# Patient Record
Sex: FEMALE | Race: WHITE | URBAN - METROPOLITAN AREA
[De-identification: names, ages, dates, MRNs, and addresses within clinical notes are randomized per-mention and may not be internally consistent; named-entity substitution may affect disease eponyms.]

---

## 2020-09-08 DIAGNOSIS — Z11.59 ENCOUNTER FOR SCREENING FOR OTHER VIRAL DISEASES: Primary | ICD-10-CM

## 2020-09-22 RX ORDER — BUPROPION HYDROCHLORIDE 300 MG/1
300 TABLET ORAL EVERY MORNING
COMMUNITY

## 2020-09-22 RX ORDER — BUPROPION HYDROCHLORIDE 150 MG/1
150 TABLET ORAL EVERY MORNING
COMMUNITY

## 2020-09-22 RX ORDER — LORATADINE 10 MG/1
10 TABLET ORAL DAILY
COMMUNITY

## 2020-09-22 RX ORDER — CHOLECALCIFEROL (VITAMIN D3) 50 MCG
1 TABLET ORAL DAILY
COMMUNITY

## 2020-09-22 ASSESSMENT — MIFFLIN-ST. JEOR: SCORE: 1325.39

## 2020-09-26 DIAGNOSIS — Z11.59 ENCOUNTER FOR SCREENING FOR OTHER VIRAL DISEASES: ICD-10-CM

## 2020-09-26 PROCEDURE — U0003 INFECTIOUS AGENT DETECTION BY NUCLEIC ACID (DNA OR RNA); SEVERE ACUTE RESPIRATORY SYNDROME CORONAVIRUS 2 (SARS-COV-2) (CORONAVIRUS DISEASE [COVID-19]), AMPLIFIED PROBE TECHNIQUE, MAKING USE OF HIGH THROUGHPUT TECHNOLOGIES AS DESCRIBED BY CMS-2020-01-R: HCPCS | Performed by: PLASTIC SURGERY

## 2020-09-27 LAB
SARS-COV-2 RNA SPEC QL NAA+PROBE: NOT DETECTED
SPECIMEN SOURCE: NORMAL

## 2020-09-28 ENCOUNTER — ANESTHESIA EVENT (OUTPATIENT)
Dept: SURGERY | Facility: AMBULATORY SURGERY CENTER | Age: 45
End: 2020-09-28

## 2020-09-28 NOTE — ANESTHESIA PREPROCEDURE EVALUATION
"Anesthesia Pre-Procedure Evaluation    Patient: Paulina Sue   MRN:     [id number] Gender:   female   Age:    44 year old :      1975        Preoperative Diagnosis: Encounter for cosmetic surgery [Z41.1]   Procedure(s):  Breast Augmentation     LABS:  CBC: No results found for: WBC, HGB, HCT, PLT  BMP: No results found for: NA, POTASSIUM, CHLORIDE, CO2, BUN, CR, GLC  COAGS: No results found for: PTT, INR, FIBR  POC: No results found for: BGM, HCG, HCGS  OTHER: No results found for: PH, LACT, A1C, EDVIN, PHOS, MAG, ALBUMIN, PROTTOTAL, ALT, AST, GGT, ALKPHOS, BILITOTAL, BILIDIRECT, LIPASE, AMYLASE, KENNETH, TSH, T4, T3, CRP, SED     Preop Vitals    BP Readings from Last 3 Encounters:   No data found for BP    Pulse Readings from Last 3 Encounters:   No data found for Pulse      Resp Readings from Last 3 Encounters:   No data found for Resp    SpO2 Readings from Last 3 Encounters:   No data found for SpO2      Temp Readings from Last 1 Encounters:   No data found for Temp    Ht Readings from Last 1 Encounters:   20 1.676 m (5' 6\")      Wt Readings from Last 1 Encounters:   20 64.9 kg (143 lb)    Estimated body mass index is 23.08 kg/m  as calculated from the following:    Height as of this encounter: 1.676 m (5' 6\").    Weight as of this encounter: 64.9 kg (143 lb).     LDA:        Past Medical History:   Diagnosis Date     Motion sickness      Thyroid disease       Past Surgical History:   Procedure Laterality Date     GYN SURGERY        Allergies   Allergen Reactions     Erythromycin Rash     Penicillins Rash                 PHYSICAL EXAM:   Mental Status/Neuro: A/A/O   Airway: Facies: Feasible  Mallampati: I  Mouth/Opening: Full  TM distance: > 6 cm  Neck ROM: Full   Respiratory:   Resp. Rate: Normal     Resp. Effort: Normal      CV:   Rate: Age appropriate  Edema: None   Comments:      Dental: Normal Dentition                Assessment:   ASA SCORE: 2    H&P: History and physical reviewed and " following examination; no interval change.    NPO Status: NPO Appropriate     Plan:   Anes. Type:  General   Pre-Medication: None   Induction:  IV (Standard)   Airway: ETT; Oral   Access/Monitoring: PIV   Maintenance: Balanced     Postop Plan:   Postop Pain: Opioids  Postop Sedation/Airway: Not planned  Disposition: Outpatient     PONV Management:   Adult Risk Factors: Female, Postop Opioids   Prevention: Ondansetron, Dexamethasone     CONSENT: Direct conversation   Plan and risks discussed with: Patient          Comments for Plan/Consent:  ETT if desires muscle relaxant otherwise LMA.                 Ramón Guzman MD       ANESTHESIA PREOP EVALUATION    PROCEDURE: Procedure(s):  Breast Augmentation    HPI: Paulina Sue is a 44 year old female who presents for above procedure.    PAST MEDICAL HISTORY:    Past Medical History:   Diagnosis Date     Motion sickness      Thyroid disease        PAST SURGICAL HISTORY:    Past Surgical History:   Procedure Laterality Date     GYN SURGERY         SOCIAL HISTORY:       Social History     Tobacco Use     Smoking status: Never Smoker     Smokeless tobacco: Never Used   Substance Use Topics     Alcohol use: Yes     Comment: rare       ALLERGIES:     Allergies   Allergen Reactions     Erythromycin Rash     Penicillins Rash       MEDICATIONS:     (Not in a hospital admission)      Current Outpatient Medications   Medication Sig Dispense Refill     buPROPion (WELLBUTRIN XL) 150 MG 24 hr tablet Take 150 mg by mouth every morning       buPROPion (WELLBUTRIN XL) 300 MG 24 hr tablet Take 300 mg by mouth every morning       LEVOTHYROXINE SODIUM PO        loratadine (CLARITIN) 10 MG tablet Take 10 mg by mouth daily       vitamin D3 (CHOLECALCIFEROL) 50 mcg (2000 units) tablet Take 1 tablet by mouth daily         Current Outpatient Medications Ordered in Epic   Medication Sig Dispense Refill     buPROPion (WELLBUTRIN XL) 150 MG 24 hr tablet Take 150 mg by mouth every morning        buPROPion (WELLBUTRIN XL) 300 MG 24 hr tablet Take 300 mg by mouth every morning       LEVOTHYROXINE SODIUM PO        loratadine (CLARITIN) 10 MG tablet Take 10 mg by mouth daily       vitamin D3 (CHOLECALCIFEROL) 50 mcg (2000 units) tablet Take 1 tablet by mouth daily       No current Epic-ordered facility-administered medications on file.        PHYSICAL EXAM:    Vitals: T Data Unavailable, P Data Unavailable, BP Data Unavailable, R Data Unavailable, SpO2  , Weight   Wt Readings from Last 2 Encounters:   09/22/20 64.9 kg (143 lb)       See doc flowsheet    NPO STATUS: see doc flowsheet    LABS:    BMP:  No results for input(s): NA, POTASSIUM, CHLORIDE, CO2, BUN, CR, GLC, EDVIN in the last 69665 hours.    LFTs:   No results for input(s): PROTTOTAL, ALBUMIN, BILITOTAL, ALKPHOS, AST, ALT, BILIDIRECT in the last 21697 hours.    CBC:   No results for input(s): WBC, RBC, HGB, HCT, MCV, MCH, MCHC, RDW, PLT in the last 53709 hours.    Coags:  No results for input(s): INR, PTT, FIBR in the last 27391 hours.    Imaging:  No orders to display       Ramón Guzman MD  Anesthesiology Staff  Pager [phone]    9/28/2020  12:52 PM

## 2020-09-29 ENCOUNTER — ANESTHESIA (OUTPATIENT)
Dept: SURGERY | Facility: AMBULATORY SURGERY CENTER | Age: 45
End: 2020-09-29
Payer: COMMERCIAL

## 2020-09-29 ENCOUNTER — HOSPITAL ENCOUNTER (OUTPATIENT)
Facility: AMBULATORY SURGERY CENTER | Age: 45
Discharge: HOME | End: 2020-09-29
Attending: PLASTIC SURGERY | Admitting: PLASTIC SURGERY
Payer: COMMERCIAL

## 2020-09-29 VITALS
SYSTOLIC BLOOD PRESSURE: 124 MMHG | BODY MASS INDEX: 22.98 KG/M2 | DIASTOLIC BLOOD PRESSURE: 80 MMHG | RESPIRATION RATE: 16 BRPM | HEART RATE: 67 BPM | HEIGHT: 66 IN | TEMPERATURE: 97.8 F | WEIGHT: 143 LBS | OXYGEN SATURATION: 100 %

## 2020-09-29 DIAGNOSIS — R52 PAIN: Primary | ICD-10-CM

## 2020-09-29 DIAGNOSIS — T88.59XA NAUSEA AFTER ANESTHESIA, INITIAL ENCOUNTER: ICD-10-CM

## 2020-09-29 DIAGNOSIS — R11.0 NAUSEA AFTER ANESTHESIA, INITIAL ENCOUNTER: ICD-10-CM

## 2020-09-29 LAB — HCG UR QL: NEGATIVE

## 2020-09-29 PROCEDURE — L8600 IMPLANT BREAST SILICONE/EQ: HCPCS | Mod: 50

## 2020-09-29 PROCEDURE — G8907 PT DOC NO EVENTS ON DISCHARG: HCPCS

## 2020-09-29 PROCEDURE — 36000140 ZZH SURGERY LEVEL COSMETIC 120 MIN

## 2020-09-29 PROCEDURE — 81025 URINE PREGNANCY TEST: CPT | Performed by: STUDENT IN AN ORGANIZED HEALTH CARE EDUCATION/TRAINING PROGRAM

## 2020-09-29 PROCEDURE — G8916 PT W IV AB GIVEN ON TIME: HCPCS

## 2020-09-29 DEVICE — IMPLANTABLE DEVICE: Type: IMPLANTABLE DEVICE | Site: BREAST | Status: FUNCTIONAL

## 2020-09-29 RX ORDER — SODIUM CHLORIDE, SODIUM LACTATE, POTASSIUM CHLORIDE, CALCIUM CHLORIDE 600; 310; 30; 20 MG/100ML; MG/100ML; MG/100ML; MG/100ML
INJECTION, SOLUTION INTRAVENOUS CONTINUOUS
Status: DISCONTINUED | OUTPATIENT
Start: 2020-09-29 | End: 2020-09-30 | Stop reason: HOSPADM

## 2020-09-29 RX ORDER — CLINDAMYCIN PHOSPHATE 900 MG/50ML
900 INJECTION, SOLUTION INTRAVENOUS
Status: DISCONTINUED | OUTPATIENT
Start: 2020-09-29 | End: 2020-09-30 | Stop reason: HOSPADM

## 2020-09-29 RX ORDER — ONDANSETRON 2 MG/ML
4 INJECTION INTRAMUSCULAR; INTRAVENOUS EVERY 30 MIN PRN
Status: DISCONTINUED | OUTPATIENT
Start: 2020-09-29 | End: 2020-09-30 | Stop reason: HOSPADM

## 2020-09-29 RX ORDER — ACETAMINOPHEN 325 MG/1
975 TABLET ORAL ONCE
Status: COMPLETED | OUTPATIENT
Start: 2020-09-29 | End: 2020-09-29

## 2020-09-29 RX ORDER — ONDANSETRON 4 MG/1
4-8 TABLET, ORALLY DISINTEGRATING ORAL EVERY 8 HOURS PRN
Qty: 4 TABLET | Refills: 0
Start: 2020-09-29

## 2020-09-29 RX ORDER — HYDROXYZINE HYDROCHLORIDE 25 MG/1
25 TABLET, FILM COATED ORAL
Status: DISCONTINUED | OUTPATIENT
Start: 2020-09-29 | End: 2020-09-30 | Stop reason: HOSPADM

## 2020-09-29 RX ORDER — CEFAZOLIN SODIUM 2 G/100ML
INJECTION, SOLUTION INTRAVENOUS PRN
Status: DISCONTINUED | OUTPATIENT
Start: 2020-09-29 | End: 2020-09-29

## 2020-09-29 RX ORDER — OXYCODONE AND ACETAMINOPHEN 5; 325 MG/1; MG/1
2 TABLET ORAL
Status: DISCONTINUED | OUTPATIENT
Start: 2020-09-29 | End: 2020-09-30 | Stop reason: HOSPADM

## 2020-09-29 RX ORDER — MEPERIDINE HYDROCHLORIDE 25 MG/ML
12.5 INJECTION INTRAMUSCULAR; INTRAVENOUS; SUBCUTANEOUS
Status: DISCONTINUED | OUTPATIENT
Start: 2020-09-29 | End: 2020-09-30 | Stop reason: HOSPADM

## 2020-09-29 RX ORDER — ONDANSETRON 2 MG/ML
INJECTION INTRAMUSCULAR; INTRAVENOUS PRN
Status: DISCONTINUED | OUTPATIENT
Start: 2020-09-29 | End: 2020-09-29

## 2020-09-29 RX ORDER — LIDOCAINE HYDROCHLORIDE 20 MG/ML
INJECTION, SOLUTION INFILTRATION; PERINEURAL PRN
Status: DISCONTINUED | OUTPATIENT
Start: 2020-09-29 | End: 2020-09-29

## 2020-09-29 RX ORDER — OXYCODONE HYDROCHLORIDE 5 MG/1
5 TABLET ORAL EVERY 4 HOURS PRN
Status: DISCONTINUED | OUTPATIENT
Start: 2020-09-29 | End: 2020-09-30 | Stop reason: HOSPADM

## 2020-09-29 RX ORDER — PROPOFOL 10 MG/ML
INJECTION, EMULSION INTRAVENOUS PRN
Status: DISCONTINUED | OUTPATIENT
Start: 2020-09-29 | End: 2020-09-29

## 2020-09-29 RX ORDER — HYDROXYZINE PAMOATE 25 MG/1
25 CAPSULE ORAL EVERY 6 HOURS PRN
Qty: 30 CAPSULE | Refills: 0
Start: 2020-09-29

## 2020-09-29 RX ORDER — OXYCODONE AND ACETAMINOPHEN 5; 325 MG/1; MG/1
1-2 TABLET ORAL EVERY 4 HOURS PRN
Qty: 16 TABLET | Refills: 0
Start: 2020-09-29

## 2020-09-29 RX ORDER — NALOXONE HYDROCHLORIDE 0.4 MG/ML
.1-.4 INJECTION, SOLUTION INTRAMUSCULAR; INTRAVENOUS; SUBCUTANEOUS
Status: DISCONTINUED | OUTPATIENT
Start: 2020-09-29 | End: 2020-09-30 | Stop reason: HOSPADM

## 2020-09-29 RX ORDER — DEXAMETHASONE SODIUM PHOSPHATE 4 MG/ML
10 INJECTION, SOLUTION INTRA-ARTICULAR; INTRALESIONAL; INTRAMUSCULAR; INTRAVENOUS; SOFT TISSUE
Status: COMPLETED | OUTPATIENT
Start: 2020-09-29 | End: 2020-09-29

## 2020-09-29 RX ORDER — ONDANSETRON 4 MG/1
4 TABLET, ORALLY DISINTEGRATING ORAL
Status: DISCONTINUED | OUTPATIENT
Start: 2020-09-29 | End: 2020-09-30 | Stop reason: HOSPADM

## 2020-09-29 RX ORDER — PROPOFOL 10 MG/ML
INJECTION, EMULSION INTRAVENOUS CONTINUOUS PRN
Status: DISCONTINUED | OUTPATIENT
Start: 2020-09-29 | End: 2020-09-29

## 2020-09-29 RX ORDER — AZITHROMYCIN 500 MG/1
500 TABLET, FILM COATED ORAL DAILY
Status: DISCONTINUED | OUTPATIENT
Start: 2020-09-29 | End: 2020-09-30 | Stop reason: HOSPADM

## 2020-09-29 RX ORDER — LIDOCAINE 40 MG/G
CREAM TOPICAL
Status: DISCONTINUED | OUTPATIENT
Start: 2020-09-29 | End: 2020-09-30 | Stop reason: HOSPADM

## 2020-09-29 RX ORDER — BUPIVACAINE HYDROCHLORIDE AND EPINEPHRINE 2.5; 5 MG/ML; UG/ML
INJECTION, SOLUTION INFILTRATION; PERINEURAL PRN
Status: DISCONTINUED | OUTPATIENT
Start: 2020-09-29 | End: 2020-09-29 | Stop reason: HOSPADM

## 2020-09-29 RX ORDER — FENTANYL CITRATE 50 UG/ML
25-50 INJECTION, SOLUTION INTRAMUSCULAR; INTRAVENOUS EVERY 5 MIN PRN
Status: DISCONTINUED | OUTPATIENT
Start: 2020-09-29 | End: 2020-09-30 | Stop reason: HOSPADM

## 2020-09-29 RX ORDER — ONDANSETRON 4 MG/1
4 TABLET, ORALLY DISINTEGRATING ORAL EVERY 30 MIN PRN
Status: DISCONTINUED | OUTPATIENT
Start: 2020-09-29 | End: 2020-09-30 | Stop reason: HOSPADM

## 2020-09-29 RX ORDER — FENTANYL CITRATE 50 UG/ML
INJECTION, SOLUTION INTRAMUSCULAR; INTRAVENOUS PRN
Status: DISCONTINUED | OUTPATIENT
Start: 2020-09-29 | End: 2020-09-29

## 2020-09-29 RX ORDER — GABAPENTIN 300 MG/1
300 CAPSULE ORAL ONCE
Status: COMPLETED | OUTPATIENT
Start: 2020-09-29 | End: 2020-09-29

## 2020-09-29 RX ORDER — DIAZEPAM 10 MG
10 TABLET ORAL EVERY 12 HOURS PRN
Status: DISCONTINUED | OUTPATIENT
Start: 2020-09-29 | End: 2020-09-30 | Stop reason: HOSPADM

## 2020-09-29 RX ADMIN — LIDOCAINE HYDROCHLORIDE 60 MG: 20 INJECTION, SOLUTION INFILTRATION; PERINEURAL at 07:26

## 2020-09-29 RX ADMIN — SODIUM CHLORIDE, SODIUM LACTATE, POTASSIUM CHLORIDE, CALCIUM CHLORIDE: 600; 310; 30; 20 INJECTION, SOLUTION INTRAVENOUS at 06:48

## 2020-09-29 RX ADMIN — ONDANSETRON 4 MG: 2 INJECTION INTRAMUSCULAR; INTRAVENOUS at 09:44

## 2020-09-29 RX ADMIN — OXYCODONE HYDROCHLORIDE 5 MG: 5 TABLET ORAL at 09:45

## 2020-09-29 RX ADMIN — GABAPENTIN 300 MG: 300 CAPSULE ORAL at 06:48

## 2020-09-29 RX ADMIN — ONDANSETRON 4 MG: 2 INJECTION INTRAMUSCULAR; INTRAVENOUS at 08:58

## 2020-09-29 RX ADMIN — Medication 20 MG: at 08:09

## 2020-09-29 RX ADMIN — Medication 20 MG: at 07:48

## 2020-09-29 RX ADMIN — ACETAMINOPHEN 975 MG: 325 TABLET ORAL at 06:48

## 2020-09-29 RX ADMIN — FENTANYL CITRATE 50 MCG: 50 INJECTION, SOLUTION INTRAMUSCULAR; INTRAVENOUS at 09:09

## 2020-09-29 RX ADMIN — SODIUM CHLORIDE, SODIUM LACTATE, POTASSIUM CHLORIDE, CALCIUM CHLORIDE: 600; 310; 30; 20 INJECTION, SOLUTION INTRAVENOUS at 07:21

## 2020-09-29 RX ADMIN — CEFAZOLIN SODIUM 2 G: 2 INJECTION, SOLUTION INTRAVENOUS at 07:28

## 2020-09-29 RX ADMIN — PROPOFOL 200 MCG/KG/MIN: 10 INJECTION, EMULSION INTRAVENOUS at 07:26

## 2020-09-29 RX ADMIN — Medication 10 MG: at 07:57

## 2020-09-29 RX ADMIN — DEXAMETHASONE SODIUM PHOSPHATE 10 MG: 4 INJECTION, SOLUTION INTRA-ARTICULAR; INTRALESIONAL; INTRAMUSCULAR; INTRAVENOUS; SOFT TISSUE at 07:28

## 2020-09-29 RX ADMIN — FENTANYL CITRATE 100 MCG: 50 INJECTION, SOLUTION INTRAMUSCULAR; INTRAVENOUS at 07:26

## 2020-09-29 RX ADMIN — Medication 40 MG: at 07:26

## 2020-09-29 RX ADMIN — PROPOFOL 200 MG: 10 INJECTION, EMULSION INTRAVENOUS at 07:26

## 2020-09-29 NOTE — ANESTHESIA CARE TRANSFER NOTE
Patient: Paulina Sue    Procedure(s):  Breast Augmentation    Diagnosis: Encounter for cosmetic surgery [Z41.1]  Diagnosis Additional Information: No value filed.    Anesthesia Type:   General     Note:  Airway :Nasal Cannula  Patient transferred to:PACU  Comments: Awake, comfortable, sats 100%, Report to RN>Handoff Report: Identifed the Patient, Identified the Reponsible Provider, Reviewed the pertinent medical history, Discussed the surgical course, Reviewed Intra-OP anesthesia mangement and issues during anesthesia, Set expectations for post-procedure period and Allowed opportunity for questions and acknowledgement of understanding      Vitals: (Last set prior to Anesthesia Care Transfer)    CRNA VITALS  9/29/2020 0839 - 9/29/2020 0913      9/29/2020             Resp Rate (observed):  (!) 2                Electronically Signed By: SAY Landis CRNA  September 29, 2020  9:13 AM

## 2020-09-29 NOTE — ANESTHESIA POSTPROCEDURE EVALUATION
Anesthesia POST Procedure Evaluation    Patient: Paulina uSe   MRN:     [id number] Gender:   female   Age:    44 year old :      1975        Preoperative Diagnosis: Encounter for cosmetic surgery [Z41.1]   Procedure(s):  Breast Augmentation   Postop Comments: No value filed.     Anesthesia Type: General       Disposition: Outpatient   Postop Pain Control: Uneventful            Sign Out: Well controlled pain   PONV: No   Neuro/Psych: Uneventful            Sign Out: Acceptable/Baseline neuro status   Airway/Respiratory: Uneventful            Sign Out: Acceptable/Baseline resp. status   CV/Hemodynamics: Uneventful            Sign Out: Acceptable CV status   Other NRE: NONE   DID A NON-ROUTINE EVENT OCCUR? No         Last Anesthesia Record Vitals:  CRNA VITALS  2020 0839 - 2020 0939      2020             Resp Rate (observed):  (!) 2          Last PACU Vitals:  Vitals Value Taken Time   /82 2020  9:30 AM   Temp 97  F (36.1  C) 2020  9:30 AM   Pulse 73 2020  9:30 AM   Resp 12 2020  9:30 AM   SpO2 100 % 2020  9:30 AM   Temp src     NIBP     Pulse     SpO2     Resp     Temp     Ht Rate     Temp 2           Electronically Signed By: Ramón Guzman MD, 2020, 10:57 AM

## 2020-09-29 NOTE — DISCHARGE INSTRUCTIONS
[facility]   Adult Discharge Orders & Instructions     For 24 hours after surgery    1. Get plenty of rest.  A responsible adult must stay with you for at least 24 hours after you leave the hospital.   2. Do not drive or use heavy equipment.  If you have weakness or tingling, don't drive or use heavy equipment until this feeling goes away.  3. Do not drink alcohol.  4. Avoid strenuous or risky activities.  Ask for help when climbing stairs.   5. You may feel lightheaded.  IF so, sit for a few minutes before standing.  Have someone help you get up.   6. If you have nausea (feel sick to your stomach): Drink only clear liquids such as apple juice, ginger ale, broth or 7-Up.  Rest may also help.  Be sure to drink enough fluids.  Move to a regular diet as you feel able.  7. You may have a slight fever. Call the doctor if your fever is over 100 F (37.7 C) (taken under the tongue) or lasts longer than 24 hours.  8. You may have a dry mouth, a sore throat, muscle aches or trouble sleeping.  These should go away after 24 hours.  9. Do not make important or legal decisions.     Call your doctor for any of the followin.  Signs of infection (fever, growing tenderness at the surgery site, a large amount of drainage or bleeding, severe pain, foul-smelling drainage, redness, swelling).    2. It has been over 8 to 10 hours since surgery and you are still not able to urinate (pass water).    3.  Headache for over 24 hours.    4.  Numbness, tingling or weakness the day after surgery (if you had spinal anesthesia).                  5. Signs of Covid-19 infection (temperature over 100 degrees, shortness of breath, cough, loss of taste/smell, generalized body aches, persistent headache,                  chills, sore throat, nausea/vomiting/diarrhea).    To contact a doctor, call  Dr. Garcia ( Or [phone])    **Acetaminophen (Tylenol)  975mg taken at 6:50am.     **Oxycodone pain medication taken at 9:45am.

## 2020-09-29 NOTE — BRIEF OP NOTE
[facility] Brief Operative Note    Pre-operative diagnosis: Hypomastia and postpartum involution   Post-operative diagnosis same   Procedure: Procedure(s):  Breast Augmentation   Surgeon(s): Surgeon(s) and Role:     * Norris Garcia MD - Primary   Estimated blood loss: minimal   Specimens: none   Findings: none   Assist: none  Complications: none  Condition: extubated and stable to PAR    Norris Garcia MD

## 2020-10-12 NOTE — OP NOTE
Procedure Date: 09/29/2020      PREOPERATIVE DIAGNOSES:   1.  Bilateral hypomastia.   2.  Significant postpartum involution.      POSTOPERATIVE DIAGNOSES:     1.  Bilateral hypomastia.   2.  Significant postpartum involution.      PROCEDURES PERFORMED:  Bilateral augmentation mammoplasty through an inframammary fold incision with the implant in a submuscular position.      IMPLANTS:     1.  Left breast implant:  Sientra 505 mL HSC smooth round high profile, high strength cohesive gel breast implant.  Reference number [id number], serial number is [id number].   2.  Right breast implant:  Sientra 505 mL HSC smooth round high profile cohesive gel breast implant, reference number [id number], serial number is [id number].      SURGEON:  Norris Garcia MD      ANESTHETIC:  General, utilizing a laryngeal masked airway.      INDICATIONS FOR PROCEDURE:  The patient is a very pleasant, attractive 45-year-old female who has seen us in consultation regarding breast augmentation.  The patient understands that breast implants are not considered a lifetime medical device and she will have to replace them within her lifetime.  The current generation of Sientra HSC gel breast implants do carry a lifetime warranty.  The patient understands there is a phenomenon known as silent rupture where the implant may fail and neither the surgeon or patient are aware of this.  I recommend MRI surveillance for silent rupture at 8-10 years postoperatively.      The patient understands there is a phenomenon known as capsular contracture where the body may form an aggressive scar capsule around the breast implant.  The rate of capsular contracture in my practice is approximately 2% when implants placed in submuscular position through an inframammary fold incision as used for the case for this patient.  The patient was told there are 4 maneuvers I utilize to diminish her chances of capsular contracture.  The first is submuscular breast implant  placement.  Submuscular breast implant placement has been shown to confer a 3-4 fold decrease in rate of capsular contracture compared with subglandular breast augmentation.  Subglandular breast augmentation carries a capsular contracture rate in the 12% to 16% range, and this can be lowered to the 3% to 4% by placing the implant in a submuscular position.  Use of an inframammary fold incision for placement of the implant has been shown to confer a 10 fold reduction of capsular contracture in clinical studies performed by Jees Loredo MD from [facility].  In his study, the rate of capsular contracture was 9.5% when the implant is placed through a periareolar incision.  This was lowered to 0.59% when an inframammary fold incision was utilized for placing the implant.  Use of breast pocket irrigations consisting of Ancef, gentamicin and bacitracin has been shown to decrease the rate of capsular contracture in clinical studies performed by Bassem Rich MD from the [facility].  The last maneuver I utilized is the use of a Mujica funnel for implant placement.  A Mujica funnel allows for a no-touch technique, which theoretically should decrease the chances of developing a biofilm and therefore capsular contracture.  The patient is 45 years of age.  She had a clear mammogram prior to surgery.  The patient understands that she needs to tell her mammographer she has breast implants.  Special mammographic views known as Bessy views are used to best visualize the breasts following augmentation mammoplasty.  The patient was told there is a condition as breast implant-associated lymphoma.  Approximately 400-500 cases have been reported in worldwide literature to date and all have been shown to occur in patients where the surgeon and patient chose to use a textured implant.  In my practice, dating to 1998, I have never used textured implants.  I only use smooth breast implants in my  practice.  There are no cases of breast implant-associated lymphoma which have occurred with use of smooth round breast implants.  The patient understands her incision will be in the inframammary fold.  She understands the risks of the operation include first and foremost the chance of capsular contracture, followed by implant malposition, asymmetry, hypertrophic scarring and possible dissatisfaction with cosmetic outcome.  Complications such as bleeding and infection are extremely rare in my practice.  I have had 1 postoperative bleed and 1 single cosmetic breast augmentation become infected in my practice dating to 1994.  The patient has been given a chance to ask questions and all were answered.  The patient provides her informed consent for the procedure.      DESCRIPTION OF PROCEDURE AND FINDINGS:  In the preoperative holding area, the inframammary fold incision was marked and consent was obtained.  This consent is in addition to our in-office consent she signed at her preoperative evaluation.  The patient was taken to the operating room, placed in supine position on the operating room table.  A successful general anesthetic was induced using a laryngeal mask airway.  The patient received 2 grams of Ancef and 10 mg of Decadron intravenously prior to commencing with surgery.  Her chest was then prepped and draped in routine sterile fashion.  The infra-areolar complexes were covered with 3M Tegaderm dressings to drape them completely out of the sterile field.  Timeout was called at 7:43 a.m.      At 7:44 a.m., incision was made in the right inframammary fold.  Incision was carried through the dermis down to the pectoralis major muscle.  I then switched to a guarded blade tip Bovie electrocautery.  I dissected several centimeters above the inframammary fold on the clavipectoral fascia.  Then, using a Kathie retractor, I established a submuscular plane.  The muscle was partially released from 3 o'clock to 6 o'clock  on the right side.  Intercostal perforating vessels were cauterized using an insulated DeBakey type monopolar forceps as I came upon them.  Finger dissection was used laterally to avoid any traction on the fourth intercostal nerve.  Superiorly, dissection was made between the pectoralis major and pectoralis minor muscles in gentle sweeping fashion using a uterine sound.  A sizer was placed and filled with air, which gave nice shape to her breast.  This also identified areas where further attenuation of the medial fibers of the pectoralis major was needed and this was accomplished surgically.  The right breast pocket was irrigated with solution consisting of 1 gram of Ancef, 80 mg of gentamicin and bacitracin in injectable saline.  This was done in accordance with studies by Bassem Rich MD, from the [facility] as a clinically proven means of diminishing the chances of capsular contracture.  The dissection pocket was then injected with 0.25% Marcaine with Kenalog for postoperative analgesia and for the anti-inflammatory properties of steroids.      Attention was then directed to the left side, where the identical operation was performed.  On the left side, the muscle was partially released from 6 o'clock to 9 o'clock.  Again, a sizer was placed and filled with air, which gave nice shape to her breast.  This identified areas were further attenuation of the medial fibers of pectoralis major was needed and this was accomplished.  All perforating vessels were cauterized using an awl medially based.  Perforating vessels were cauterized using an insulated bayonet style guarded monopolar forceps.  Hemostasis was excellent.  The left breast pocket was irrigated with the Ancef, gentamicin, bacitracin solution.  At this point, all operative personnel changed their gloves.  The implants were opened on the back table and soaked in the antibiotic solution.  A Mujica funnel was opened and trimmed to the  appropriate size for a 505 mL implant.  The funnel was lubricated with the antibiotic solution.  The implant was transferred to the Mujica funnel and then the Mujica funnel was utilized to insert the breast implant in the right breast pocket utilizing a no-touch technique.  Closure consisted of 3-0 Vicryl sutures in the muscular layer.  Deep parenchymal layer was closed using 4-0 PDS in buried and interrupted deep dermal fashion.  The skin of the hiatal fold incision was closed using 4-0 Monocryl suture in running and buried intracuticular fashion.  The skin was then run using 4-0 Prolene suture in running and buried continuous intracuticular fashion.  The inframammary fold incision was dressed using Mastisol and 3M Steri-Strips.      Attention was then directed to the left side.  The left side had been irrigated with the Ancef, gentamicin and bacitracin solution and injected with 0.25% Marcaine with 1:100,000 epinephrine for analgesia.  At this point, all operative personnel changed their gloves.  The implant was soaked in the antibiotic solution.  The Mujica funnel was lubricated with antibiotic solution.  The implant was transferred from its sterile container to the Mujica funnel.  The implant was inserted into the pocket utilizing a no-touch technique.  Closure was identical from left to right.  Dressings were identical from left to right.  The patient was placed in her surgical bra on the operating room table.      ROOM TIMES:  In-room time:  7:21 a.m.  Timeout called:  7:44 a.m.  Incision:  7:45 a.m.  End of procedure:  9:04 a.m.  Out of room time:  9:09 a.m.  I should that the patient paid for 2 hours of paid surgical operative time.  Actual paid operative time was 1 hour and 19 minutes or 41 minutes of paid surgical time ahead of what the patient paid for.         VIVIAN SAUER MD             D: 10/12/2020   T: 10/12/2020   MT: MAHENDRA      Name:     CRISTINA COFFMAN   MRN:      [id number]        Account:         [id number]   :      1975           Procedure Date: 2020      Document: [id number]

## (undated) DEVICE — NDL 19GA 1.5"

## (undated) DEVICE — DRSG TEGADERM 2 3/8X2 3/4" 1624W

## (undated) DEVICE — SU MONOCRYL 4-0 P-3 18" UND Y494G

## (undated) DEVICE — SUCTION TIP YANKAUER W/O VENT K86

## (undated) DEVICE — SYR 50ML LL W/O NDL 309653

## (undated) DEVICE — GLOVE PROTEXIS W/NEU-THERA 6.5  2D73TE65

## (undated) DEVICE — ESU PENCIL SMOKE EVAC W/ROCKER SWITCH 0703-047-000

## (undated) DEVICE — ESU ELEC BLADE 2.75" COATED/INSULATED E1455

## (undated) DEVICE — SYR BULB IRRIG DOVER 60 ML LATEX FREE 67000

## (undated) DEVICE — DRSG ABDOMINAL 07 1/2X8" 7197D

## (undated) DEVICE — PACK MINOR SBA15MIFSE

## (undated) DEVICE — SU PROLENE 4-0 PS-2 18" 8682G

## (undated) DEVICE — MARKER SKIN DOUBLE TIP W/FLEXI-RULER W/LABELS

## (undated) DEVICE — ESU ELEC BLADE 6" COATED/INSULATED E1455-6

## (undated) DEVICE — DRSG STERI STRIP 1/2X4" R1547

## (undated) DEVICE — SUCTION CANISTER MEDIVAC LINER 1500ML W/LID 65651-515

## (undated) DEVICE — PREP CHLORAPREP 26ML TINTED ORANGE  260815

## (undated) DEVICE — GLOVE PROTEXIS BLUE W/NEU-THERA 7.0  2D73EB70

## (undated) DEVICE — ADH LIQUID MASTISOL TOPICAL VIAL 2-3ML 0523-48

## (undated) DEVICE — GLOVE PROTEXIS W/NEU-THERA 7.5  2D73TE75

## (undated) DEVICE — SU VICRYL 3-0 SH 27" UND J416H

## (undated) DEVICE — SU PROLENE 6-0 P-1 18" 8697G

## (undated) DEVICE — SOL WATER IRRIG 1000ML BOTTLE 07139-09

## (undated) DEVICE — NDL SPINAL 25GA 3.5" QUINCKE 405180

## (undated) DEVICE — ESU NDL COLORADO MICRO 3CM STR N103A

## (undated) DEVICE — STPL SKIN 35W 054887

## (undated) DEVICE — DECANTER TRANSFER DEVICE 2008S

## (undated) DEVICE — DRSG KERLIX FLUFFS X5

## (undated) RX ORDER — ACETAMINOPHEN 325 MG/1
TABLET ORAL
Status: DISPENSED
Start: 2020-09-29

## (undated) RX ORDER — GABAPENTIN 300 MG/1
CAPSULE ORAL
Status: DISPENSED
Start: 2020-09-29

## (undated) RX ORDER — ONDANSETRON 2 MG/ML
INJECTION INTRAMUSCULAR; INTRAVENOUS
Status: DISPENSED
Start: 2020-09-29

## (undated) RX ORDER — GENTAMICIN 40 MG/ML
INJECTION, SOLUTION INTRAMUSCULAR; INTRAVENOUS
Status: DISPENSED
Start: 2020-09-29

## (undated) RX ORDER — PROPOFOL 10 MG/ML
INJECTION, EMULSION INTRAVENOUS
Status: DISPENSED
Start: 2020-09-29

## (undated) RX ORDER — OXYCODONE HYDROCHLORIDE 5 MG/1
TABLET ORAL
Status: DISPENSED
Start: 2020-09-29

## (undated) RX ORDER — CEFAZOLIN SODIUM 1 G/3ML
INJECTION, POWDER, FOR SOLUTION INTRAMUSCULAR; INTRAVENOUS
Status: DISPENSED
Start: 2020-09-29

## (undated) RX ORDER — TRIAMCINOLONE ACETONIDE 40 MG/ML
INJECTION, SUSPENSION INTRA-ARTICULAR; INTRAMUSCULAR
Status: DISPENSED
Start: 2020-09-29

## (undated) RX ORDER — FENTANYL CITRATE 50 UG/ML
INJECTION, SOLUTION INTRAMUSCULAR; INTRAVENOUS
Status: DISPENSED
Start: 2020-09-29

## (undated) RX ORDER — CLINDAMYCIN PHOSPHATE 150 MG/ML
INJECTION, SOLUTION INTRAVENOUS
Status: DISPENSED
Start: 2020-09-29

## (undated) RX ORDER — LIDOCAINE HYDROCHLORIDE 20 MG/ML
INJECTION, SOLUTION INFILTRATION; PERINEURAL
Status: DISPENSED
Start: 2020-09-29

## (undated) RX ORDER — DEXAMETHASONE SODIUM PHOSPHATE 4 MG/ML
INJECTION, SOLUTION INTRA-ARTICULAR; INTRALESIONAL; INTRAMUSCULAR; INTRAVENOUS; SOFT TISSUE
Status: DISPENSED
Start: 2020-09-29

## (undated) RX ORDER — BUPIVACAINE HYDROCHLORIDE AND EPINEPHRINE 2.5; 5 MG/ML; UG/ML
INJECTION, SOLUTION INFILTRATION; PERINEURAL
Status: DISPENSED
Start: 2020-09-29